# Patient Record
Sex: FEMALE | Race: WHITE | Employment: OTHER | URBAN - METROPOLITAN AREA
[De-identification: names, ages, dates, MRNs, and addresses within clinical notes are randomized per-mention and may not be internally consistent; named-entity substitution may affect disease eponyms.]

---

## 2018-02-07 ENCOUNTER — TELEPHONE (OUTPATIENT)
Dept: NEUROLOGY | Age: 78
End: 2018-02-07

## 2018-02-07 ENCOUNTER — OFFICE VISIT (OUTPATIENT)
Dept: NEUROLOGY | Age: 78
End: 2018-02-07

## 2018-02-07 VITALS
OXYGEN SATURATION: 98 % | HEART RATE: 112 BPM | DIASTOLIC BLOOD PRESSURE: 90 MMHG | WEIGHT: 136 LBS | SYSTOLIC BLOOD PRESSURE: 180 MMHG

## 2018-02-07 DIAGNOSIS — R53.1 WEAKNESS: Primary | ICD-10-CM

## 2018-02-07 NOTE — PROGRESS NOTES
NEUROLOGY NEW PATIENT OFFICE CONSULTATION      0/8/1160    RE: Vernell Garg         [birth year]      REFERRED BY:  No primary care provider on file. 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 HPI:     14 months ago, patient noted vocal cord changes. Patient saw an ENT doctor and noted R vocal cord was not working and was said to have a tumor. 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 Pet scan was cancelled due to medicare will not pay for it. 6 months ago, noted weakness of the right upper extremity. (+) numbness (+) increase redness of the R UE  (-) neck pain  (+) R eye ptosis  (+) weight loss  (-) dysarthria    ROS  All other systems reviewed and are negative  (-) fever  (-) rash    Past Medical Hx  Past Medical History:   Diagnosis Date    Hypertension      Breast CA 9 yr R side - s/p partial mastectomy - refused chemo and radiation. Social Hx  Social History     Social History    Marital status:      Spouse name: N/A    Number of children: N/A    Years of education: N/A     Social History Main Topics    Smoking status: Never Smoker    Smokeless tobacco: Never Used    Alcohol use No    Drug use: None    Sexual activity: Not Asked     Other Topics Concern    None     Social History Narrative    None     Family Hx  Family History   Problem Relation Age of Onset    Heart Disease Father     Cancer Brother       Brain CA    ALLERGIES  Allergies   Allergen Reactions    Amoxicillin Palpitations       CURRENT MEDS          PREVIOUS WORKUP: (reviewed)  IMAGING:    CT Results (recent):  No results found for this or any previous visit. MRI Results (recent):  No results found for this or any previous visit. IR Results (recent):  No results found for this or any previous visit.     VAS/US Results (recent):  No results found for this or any previous visit. LABS (reviewed)  No results found for this or any previous visit. Physical Exam:     Visit Vitals    /90    Pulse (!) 112    Wt 61.7 kg (136 lb)    SpO2 98%     General:  Alert, cooperative, no distress. Head:  Normocephalic, without obvious abnormality, atraumatic. Eyes:  Conjunctivae/corneas clear. Lungs:  Heart:   Non labored breathing  Regular rate and rhythm, no carotid bruits   Abdomen:   Soft, non-distended   Extremities: Extremities normal, atraumatic, no cyanosis or edema. Pulses: 2+ and symmetric all extremities. Skin: Skin color, texture, turgor normal. No rashes or lesions. Neurologic Exam     Gen: Attention normal             Language: naming, repetition, fluency normal             Memory: intact recent and remote memory  Cranial Nerves:  I: smell Not tested   II: visual fields Full to confrontation   II: pupils R miosis   II: optic disc No papilledema   III,VII: ptosis R eye ptosis   III,IV,VI: extraocular muscles  Full ROM   V: mastication normal   V: facial light touch sensation  normal   VII: facial muscle function   symmetric   VIII: hearing symmetric   IX: soft palate elevation  normal   XI: trapezius strength  5/5   XI: sternocleidomastoid strength 5/5   XI: neck flexion strength  5/5   XII: tongue  midline     Motor: R deltoid 1/5  R biceps 1/5  R triceps 1/5  Wrist extensor 4/5  Wrist flexor 4/5  Intrinsic hand 4/5  Rest is 5/5  Sensory: Dec PP whole R arm  (+) mild erythema of the R arm compared to the left  Reflexes: absent in the R UE, rest is  2+ throughout; Down going toes  Coordination: Good FTN and HTS  Gait: normal gait including tandem            Impression:     Anne Linda is a 69 y.o. female who  has a past medical history of Hypertension. and R Breast CA 9 yrs ago - s/p partial mastectomy - refused chemo and radiation. who 14 months ago, noted vocal cord changes.  Patient saw an ENT doctor and noted R vocal cord was not working and was said to have a tumor. 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 Pet scan was cancelled due to medicare will not pay for it. 6 months ago, noted weakness of the right upper extremity. (+) numbness (+) increase redness of the R UE. Patient also developed R eye ptosis 2 yrs ago. 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 Considerations include recurrence of her breast CA or a Pancoast tumor. RECOMMENDATIONS  1. I had a long discussion with patient and friend. Discussed diagnosis, prognosis, pathophysiology and available treatment. All questions were answered. 2. Will refer to a cardiothoracic surgeon with the hopes that they can remove the tumor to relieve pressure on the nerves and also to get a tissue diagnosis  3. Advise to follow up with her oncology doctor    I spent total of 60 mins with the patient, greater than 50% of the visit was spent on providing counseling and coordination of care. Follow-up Disposition:  Return if symptoms worsen or fail to improve. Thank you for the consultation      Ronald Stanton MD  Diplomate, American Board of Psychiatry and Neurology  Diplomate, Neuromuscular Medicine  Diplomate, American Board of Electrodiagnostic Medicine        CC: No primary care provider on file.   Fax: None

## 2018-02-07 NOTE — MR AVS SNAPSHOT
[address] [address] [id number] [place] [address] 
[phone] Patient: Sam Shafer MRN: [id number] [facility] Visit Information Date & Time Provider Department Dept. Phone Encounter #  
 2018  1:00 PM Allison Lopez, [address] [facility] [phone] [id number] Follow-up Instructions Return if symptoms worsen or fail to improve. Upcoming Health Maintenance Date Due DTaP/Tdap/Td series (1 - Tdap) 10/24/1962 ZOSTER VACCINE AGE 60> 2001 GLAUCOMA SCREENING Q2Y 10/24/2006 OSTEOPOROSIS SCREENING (DEXA) 10/24/2006 Pneumococcal 65+ Low/Medium Risk (1 of 2 - PCV13) 10/24/2006 MEDICARE YEARLY EXAM 10/24/2006 Influenza Age 5 to Adult 2017 Allergies as of 2018  Review Complete On: 2018 By: Shaina Amaya LPN Severity Noted Reaction Type Reactions Amoxicillin  2018    Palpitations Current Immunizations  Never Reviewed No immunizations on file. Not reviewed this visit You Were Diagnosed With   
  
 Codes Comments Weakness    -  Primary ICD-10-CM: R53.1 ICD-9-CM: 780.79 Vitals BP Pulse Weight(growth percentile) SpO2 Smoking Status 180/90 (!) 112 136 lb (61.7 kg) 98% Never Smoker Your Updated Medication List  
  
Notice  As of 2018  2:02 PM  
 You have not been prescribed any medications. We Performed the Following REFERRAL TO CARDIOTHORACIC SURGEON [REF13 Custom] Comments:  
 Evaluate and treat for upper R thoracic/subclavian tumor; History of breast CA 7 yrs ago. Surgical opinion for removal of recurrence of breast CA or a pancoast tumor causing compression of the R recurrent laryngeal nerve, R sympathetic chain at T1 and the R brachial plexus. Follow-up Instructions Return if symptoms worsen or fail to improve. Referral Information  Referral ID Referred By Referred To [id number] BRIAN De Leon Not Available Visits Status Start Date End Date 1 New Request 2/7/18 2/7/19 If your referral has a status of pending review or denied, additional information will be sent to support the outcome of this decision. Introducing [facility]! [employer] introduces [employer] patient portal. Now you can access parts of your medical record, email your doctor's office, and request medication refills online. 1. In your internet browser, go to [email or web address] 2. Click on the First Time User? Click Here link in the Sign In box. You will see the New Member Sign Up page. 3. Enter your [employer] Access Code exactly as it appears below. You will not need to use this code after youve completed the sign-up process. If you do not sign up before the expiration date, you must request a new code. · [employer] Access Code: [email or web address] Expires: 5/8/2018 12:34 PM 
 
4. Enter the last four digits of your Social Security Number (xxxx) and Date of Birth (mm/dd/yyyy) as indicated and click Submit. You will be taken to the next sign-up page. 5. Create a [employer] ID. This will be your [employer] login ID and cannot be changed, so think of one that is secure and easy to remember. 6. Create a [employer] password. You can change your password at any time. 7. Enter your Password Reset Question and Answer. This can be used at a later time if you forget your password. 8. Enter your e-mail address. You will receive e-mail notification when new information is available in [address] 9. Click Sign Up. You can now view and download portions of your medical record. 10. Click the Download Summary menu link to download a portable copy of your medical information. If you have questions, please visit the Frequently Asked Questions section of the [employer] website. Remember, [employer] is NOT to be used for urgent needs. For medical emergencies, dial 911.  
 
 
Now available from your iPhone and Android! Please provide this summary of care documentation to your next provider. If you have any questions after today's visit, please call [phone].

## 2018-02-07 NOTE — TELEPHONE ENCOUNTER
----- Message from Laith Gomes sent at 2/7/2018  3:59 PM EST -----  Regarding: Dr Osorio Peñaloza  Pt stated the cardiologist she was referred to , voicemail was full and they weren't sure if they were given the correct number.     Contact [phone]

## 2018-02-08 NOTE — TELEPHONE ENCOUNTER
TC- Notified patient referral was faxed to Dr. Denia Yu at [facility]. Patient verbalized understanding.

## 2018-02-13 NOTE — TELEPHONE ENCOUNTER
Notified patient a recent ct scan (chest) will be required before she can be seen Dr. Mora Overton (Cardiac & Thoracic Surgery). Notified patient of N.O CT scan (chest). Patient given scheduling number to schedule CT scan.

## 2018-02-15 ENCOUNTER — HOSPITAL ENCOUNTER (OUTPATIENT)
Dept: CT IMAGING | Age: 78
Discharge: HOME | End: 2018-02-15
Attending: PSYCHIATRY & NEUROLOGY
Payer: MEDICARE

## 2018-02-15 DIAGNOSIS — R22.2 MASS IN CHEST: ICD-10-CM

## 2018-02-15 LAB — CREAT BLD-MCNC: 0.6 MG/DL (ref 0.6–1.3)

## 2018-02-15 PROCEDURE — 82565 ASSAY OF CREATININE: CPT

## 2018-02-15 PROCEDURE — 74011636320 HC RX REV CODE- 636/320: Performed by: PSYCHIATRY & NEUROLOGY

## 2018-02-15 PROCEDURE — 71260 CT THORAX DX C+: CPT

## 2018-02-15 RX ADMIN — IOPAMIDOL 100 ML: 612 INJECTION, SOLUTION INTRAVENOUS at 15:00

## 2018-02-19 ENCOUNTER — TELEPHONE (OUTPATIENT)
Dept: NEUROLOGY | Age: 78
End: 2018-02-19

## 2018-02-19 NOTE — TELEPHONE ENCOUNTER
----- Message from Celena Wheat sent at 2/19/2018 12:22 PM EST -----  Regarding: /telephone  #urgent#  Pt is requesting a call back in regards to CT scan and what the next step will be. Best contact [phone].

## 2020-03-06 NOTE — TELEPHONE ENCOUNTER
TC- Notified patient daughter CT chest results was faxed to Dr. Heidi Michelle office. Patient need to f/u with Dr. Heidi Michelle office to see if he will continue with care if not patient should f/u with Dr. Rabia Nguyen. Daughter verbalized understanding. no

## 2021-08-27 ENCOUNTER — TRANSCRIBE ORDER (OUTPATIENT)
Dept: SCHEDULING | Age: 81
End: 2021-08-27

## 2021-08-27 DIAGNOSIS — C50.919 BREAST CA (HCC): Primary | ICD-10-CM

## 2021-08-27 DIAGNOSIS — J43.9 EMPHYSEMATOUS BLEB (HCC): Primary | ICD-10-CM

## 2021-08-27 DIAGNOSIS — I70.0 ATHEROSCLEROSIS OF AORTA (HCC): Primary | ICD-10-CM

## 2021-09-23 ENCOUNTER — TRANSCRIBE ORDER (OUTPATIENT)
Dept: SCHEDULING | Age: 81
End: 2021-09-23

## 2021-09-23 DIAGNOSIS — I70.0 ATHEROSCLEROSIS OF AORTA (HCC): Primary | ICD-10-CM

## 2021-10-06 ENCOUNTER — HOSPITAL ENCOUNTER (OUTPATIENT)
Dept: LAB | Age: 81
Discharge: HOME | End: 2021-10-06
Attending: INTERNAL MEDICINE
Payer: MEDICARE

## 2021-10-06 ENCOUNTER — TRANSCRIBE ORDER (OUTPATIENT)
Dept: REGISTRATION | Age: 81
End: 2021-10-06

## 2021-10-06 ENCOUNTER — HOSPITAL ENCOUNTER (OUTPATIENT)
Dept: CT IMAGING | Age: 81
Discharge: HOME | End: 2021-10-06
Attending: INTERNAL MEDICINE
Payer: MEDICARE

## 2021-10-06 DIAGNOSIS — I70.0 ATHEROSCLEROSIS OF AORTA (HCC): ICD-10-CM

## 2021-10-06 DIAGNOSIS — I70.0 ATHEROSCLEROSIS OF AORTA (HCC): Primary | ICD-10-CM

## 2021-10-06 LAB — CREAT SERPL-MCNC: 0.64 MG/DL (ref 0.55–1.02)

## 2021-10-06 PROCEDURE — 74011000636 HC RX REV CODE- 636: Performed by: NURSE ANESTHETIST, CERTIFIED REGISTERED

## 2021-10-06 PROCEDURE — 82565 ASSAY OF CREATININE: CPT

## 2021-10-06 PROCEDURE — 71260 CT THORAX DX C+: CPT

## 2021-10-06 PROCEDURE — 70491 CT SOFT TISSUE NECK W/DYE: CPT

## 2021-10-06 PROCEDURE — 36415 COLL VENOUS BLD VENIPUNCTURE: CPT

## 2021-10-06 RX ADMIN — IOPAMIDOL 100 ML: 755 INJECTION, SOLUTION INTRAVENOUS at 13:56
